# Patient Record
Sex: MALE | Race: WHITE | HISPANIC OR LATINO | Employment: FULL TIME | URBAN - METROPOLITAN AREA
[De-identification: names, ages, dates, MRNs, and addresses within clinical notes are randomized per-mention and may not be internally consistent; named-entity substitution may affect disease eponyms.]

---
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
## 2023-01-27 ENCOUNTER — TELEPHONE (OUTPATIENT)
Dept: MEDICAL GROUP | Facility: PHYSICIAN GROUP | Age: 23
End: 2023-01-27

## 2023-01-27 ENCOUNTER — OFFICE VISIT (OUTPATIENT)
Dept: MEDICAL GROUP | Facility: PHYSICIAN GROUP | Age: 23
End: 2023-01-27
Payer: COMMERCIAL

## 2023-01-27 ENCOUNTER — HOSPITAL ENCOUNTER (OUTPATIENT)
Dept: LAB | Facility: MEDICAL CENTER | Age: 23
End: 2023-01-27
Attending: FAMILY MEDICINE
Payer: COMMERCIAL

## 2023-01-27 VITALS
BODY MASS INDEX: 33.63 KG/M2 | DIASTOLIC BLOOD PRESSURE: 74 MMHG | WEIGHT: 240.2 LBS | HEIGHT: 71 IN | HEART RATE: 102 BPM | TEMPERATURE: 97.3 F | RESPIRATION RATE: 16 BRPM | SYSTOLIC BLOOD PRESSURE: 126 MMHG | OXYGEN SATURATION: 95 %

## 2023-01-27 DIAGNOSIS — R74.8 ELEVATED LIVER ENZYMES: ICD-10-CM

## 2023-01-27 DIAGNOSIS — R21 SKIN RASH: ICD-10-CM

## 2023-01-27 PROBLEM — S93.402A SPRAIN OF LEFT ANKLE: Status: RESOLVED | Noted: 2021-04-21 | Resolved: 2023-01-27

## 2023-01-27 PROBLEM — Z00.00 WELLNESS EXAMINATION: Status: RESOLVED | Noted: 2021-04-21 | Resolved: 2023-01-27

## 2023-01-27 LAB
ALBUMIN SERPL BCP-MCNC: 4.7 G/DL (ref 3.2–4.9)
ALP SERPL-CCNC: 101 U/L (ref 30–99)
ALT SERPL-CCNC: 78 U/L (ref 2–50)
AST SERPL-CCNC: 56 U/L (ref 12–45)
BILIRUB CONJ SERPL-MCNC: <0.2 MG/DL (ref 0.1–0.5)
BILIRUB INDIRECT SERPL-MCNC: ABNORMAL MG/DL (ref 0–1)
BILIRUB SERPL-MCNC: 0.4 MG/DL (ref 0.1–1.5)
PROT SERPL-MCNC: 8.2 G/DL (ref 6–8.2)

## 2023-01-27 PROCEDURE — 99213 OFFICE O/P EST LOW 20 MIN: CPT | Performed by: FAMILY MEDICINE

## 2023-01-27 PROCEDURE — 80076 HEPATIC FUNCTION PANEL: CPT

## 2023-01-27 PROCEDURE — 36415 COLL VENOUS BLD VENIPUNCTURE: CPT

## 2023-01-27 RX ORDER — CLINDAMYCIN PHOSPHATE 11.9 MG/ML
1 SOLUTION TOPICAL 2 TIMES DAILY
Qty: 30 ML | Refills: 3 | Status: SHIPPED | OUTPATIENT
Start: 2023-01-27

## 2023-01-27 ASSESSMENT — PATIENT HEALTH QUESTIONNAIRE - PHQ9: CLINICAL INTERPRETATION OF PHQ2 SCORE: 0

## 2023-01-27 ASSESSMENT — FIBROSIS 4 INDEX: FIB4 SCORE: 0.5

## 2023-01-27 NOTE — ASSESSMENT & PLAN NOTE
This is a chronic problem.  Patient is here for several rashes that he like evaluated.  He would like a referral to dermatologist.  He has a rash on the back of his neck in the hairline that gets itchy.  He also gets a rash on his upper arms that bother him at times.

## 2023-01-27 NOTE — PROGRESS NOTES
"Subjective:     CC: Here for several issues.    HPI:   Braulio De La Rosa presents today with the following medical concerns:    Skin rash  This is a chronic problem.  Patient is here for several rashes that he like evaluated.  He would like a referral to dermatologist.  He has a rash on the back of his neck in the hairline that gets itchy.  He also gets a rash on his upper arms that bother him at times.    Elevated liver enzymes  This is a chronic problem.  When patient was last seen he had elevation of his liver test.  A follow-up test confirmed that and he never followed back up.  We discussed that today and we will recheck him today before we do further work-up.    History reviewed. No pertinent past medical history.    Social History     Tobacco Use    Smoking status: Never    Smokeless tobacco: Never   Vaping Use    Vaping Use: Some days    Substances: Nicotine    Devices: RefTagasaurisble tank   Substance Use Topics    Alcohol use: Not Currently    Drug use: Never       Current Outpatient Medications Ordered in Epic   Medication Sig Dispense Refill    clindamycin (CLEOCIN) 1 % Solution Apply 1 Application topically 2 times a day. 30 mL 3     No current [facility]-ordered facility-administered medications on file.       Allergies:  Patient has no known allergies.    Health Maintenance: Completed    ROS:  Gen: no fevers/chills, no changes in weight  Eyes: no changes in vision  ENT: no sore throat, no hearing loss, no bloody nose  Pulm: no sob, no cough  CV: no chest pain, no palpitations  GI: no nausea/vomiting, no diarrhea  : no dysuria  MSk: no myalgias  Skin: no rash  Neuro: no headaches, no numbness/tingling  Heme/Lymph: no easy bruising      Objective:       Exam:  /74 (BP Location: Right arm, Patient Position: Sitting, BP Cuff Size: Adult)   Pulse (!) 102   Temp 36.3 °C (97.3 °F) (Temporal)   Resp 16   Ht 1.803 m (5' 11\")   Wt 109 kg (240 lb 3.2 oz)   SpO2 95%   BMI 33.50 kg/m²  Body mass index is 33.5 " kg/m².    Gen: Alert and oriented, No apparent distress.  Skin:    Patient has follicular bumps to the back of his neck in the hairline.  There are some did appear to be an excoriated.  Some lichenification is present as well.  No drainage is seen.  No induration.    Patient has a mild red rash to his upper arms on both sides.        Labs: Reviewed with patient and ordered    Assessment & Plan:     22 y.o. male with the following -     1. Elevated liver enzymes  This is a new problem noted on last visit.  I told him we will recheck the liver test today.  If they are still elevated then I will order a test for hepatitis.  If that is negative then we will get a liver ultrasound.  He was told he might have fatty infiltrates in the liver causing the problem but we need to determine what is going on.  - HEPATIC FUNCTION PANEL; Future    2. Skin rash  These are chronic problems.  We will try Cleocin topical solution to the back of his neck for now.  Referral to dermatology made.  And he is to use some type of hydrating lotion to his upper arms.  - Referral to Dermatology      Return if symptoms worsen or fail to improve.    Please note that this dictation was created using voice recognition software. I have made every reasonable attempt to correct obvious errors, but I expect that there are errors of grammar and possibly content that I did not discover before finalizing the note.

## 2023-01-27 NOTE — ASSESSMENT & PLAN NOTE
This is a chronic problem.  When patient was last seen he had elevation of his liver test.  A follow-up test confirmed that and he never followed back up.  We discussed that today and we will recheck him today before we do further work-up.

## 2023-01-28 NOTE — TELEPHONE ENCOUNTER
----- Message from True Smith III, M.D. sent at 1/27/2023  4:53 PM PST -----  Please tell him that I got back his liver test and they are still above normal although a little better than before.  At this point I think we need to go ahead and get blood test for hepatitis panel and that has been ordered.  We also should go ahead and get the liver ultrasound that we discussed and I have ordered that as well.  When I get those results I can tell him what we need to do next.    Dr. Smith

## 2023-01-28 NOTE — TELEPHONE ENCOUNTER
Phone Number Called:  [phone]      Call outcome: Called the pt.  Provided the information from the message from Dr. Biswas. pt verbalized understanding. Gave him the number to imaging to schedule the ultrasound and advised him he can [facility] to get his lab drawn

## 2023-02-23 ENCOUNTER — HOSPITAL ENCOUNTER (OUTPATIENT)
Dept: LAB | Facility: MEDICAL CENTER | Age: 23
End: 2023-02-23
Attending: FAMILY MEDICINE
Payer: COMMERCIAL

## 2023-02-23 ENCOUNTER — APPOINTMENT (OUTPATIENT)
Dept: RADIOLOGY | Facility: MEDICAL CENTER | Age: 23
End: 2023-02-23
Attending: FAMILY MEDICINE
Payer: COMMERCIAL

## 2023-02-23 DIAGNOSIS — R74.8 ELEVATED LIVER ENZYMES: ICD-10-CM

## 2023-02-23 PROCEDURE — 36415 COLL VENOUS BLD VENIPUNCTURE: CPT

## 2023-02-23 PROCEDURE — 76705 ECHO EXAM OF ABDOMEN: CPT

## 2023-02-23 PROCEDURE — 80074 ACUTE HEPATITIS PANEL: CPT

## 2023-02-24 ENCOUNTER — TELEPHONE (OUTPATIENT)
Dept: MEDICAL GROUP | Facility: PHYSICIAN GROUP | Age: 23
End: 2023-02-24
Payer: COMMERCIAL

## 2023-02-24 LAB
HAV IGM SERPL QL IA: NORMAL
HBV CORE IGM SER QL: NORMAL
HBV SURFACE AG SER QL: NORMAL
HCV AB SER QL: NORMAL

## 2023-02-24 NOTE — TELEPHONE ENCOUNTER
----- Message from True Smith III, M.D. sent at 2/23/2023  2:54 PM PST -----  Regarding: Labs  Please call the patient and tell him that the ultrasound showed he may have fatty infiltrates in his liver.  This can cause the enzymes to be higher than normal.  The only treatment is to lose weight.  Have him try to work on losing 5 to 10 pounds over the next couple of months and then in 3 months we will recheck the liver test to see if they have improved.    Dr. Smith

## 2023-02-24 NOTE — TELEPHONE ENCOUNTER
Phone Number Called:  [phone]      Call outcome: Called the pt.  Provided the information from the message from Dr. Biswas. pt verbalized understanding. Was advised that he can go to the lab to get lab drawn as order is in chart. Can come in for MA visit for Immunizations if he would like too.

## 2023-02-24 NOTE — TELEPHONE ENCOUNTER
----- Message from True Smith III, M.D. sent at 2/24/2023  1:59 PM PST -----  Please tell the patient that his hepatitis test were all negative.  He should consider getting the immunizations for hepatitis a and B.    Dr. Smith